# Patient Record
Sex: FEMALE | Race: WHITE | NOT HISPANIC OR LATINO
[De-identification: names, ages, dates, MRNs, and addresses within clinical notes are randomized per-mention and may not be internally consistent; named-entity substitution may affect disease eponyms.]

---

## 2019-10-03 ENCOUNTER — RECORDS - HEALTHEAST (OUTPATIENT)
Dept: ADMINISTRATIVE | Facility: OTHER | Age: 64
End: 2019-10-03

## 2019-10-03 ENCOUNTER — RECORDS - HEALTHEAST (OUTPATIENT)
Dept: LAB | Facility: CLINIC | Age: 64
End: 2019-10-03

## 2019-10-03 LAB
ALBUMIN SERPL-MCNC: 3.3 G/DL (ref 3.5–5)
ALP SERPL-CCNC: 101 U/L (ref 45–120)
ALT SERPL W P-5'-P-CCNC: 13 U/L (ref 0–45)
ANION GAP SERPL CALCULATED.3IONS-SCNC: 11 MMOL/L (ref 5–18)
AST SERPL W P-5'-P-CCNC: 17 U/L (ref 0–40)
BILIRUB SERPL-MCNC: 0.2 MG/DL (ref 0–1)
BUN SERPL-MCNC: 12 MG/DL (ref 8–22)
CALCIUM SERPL-MCNC: 8.9 MG/DL (ref 8.5–10.5)
CHLORIDE BLD-SCNC: 106 MMOL/L (ref 98–107)
CO2 SERPL-SCNC: 23 MMOL/L (ref 22–31)
CREAT SERPL-MCNC: 0.65 MG/DL (ref 0.6–1.1)
FOLATE SERPL-MCNC: 12.1 NG/ML
GFR SERPL CREATININE-BSD FRML MDRD: >60 ML/MIN/1.73M2
GLUCOSE BLD-MCNC: 116 MG/DL (ref 70–125)
POTASSIUM BLD-SCNC: 3.4 MMOL/L (ref 3.5–5)
PROT SERPL-MCNC: 6.9 G/DL (ref 6–8)
SODIUM SERPL-SCNC: 140 MMOL/L (ref 136–145)
TSH SERPL DL<=0.005 MIU/L-ACNC: 4.2 UIU/ML (ref 0.3–5)
VIT B12 SERPL-MCNC: 394 PG/ML (ref 213–816)

## 2019-10-04 LAB
25(OH)D3 SERPL-MCNC: 10.2 NG/ML (ref 30–80)
BACTERIA SPEC CULT: NO GROWTH

## 2019-10-11 ENCOUNTER — HOSPITAL ENCOUNTER (OUTPATIENT)
Dept: CT IMAGING | Facility: CLINIC | Age: 64
Discharge: HOME | End: 2019-10-11
Attending: PHYSICIAN ASSISTANT

## 2019-10-11 DIAGNOSIS — R05.9 COUGH: ICD-10-CM

## 2019-10-11 DIAGNOSIS — F17.200 SMOKER: ICD-10-CM

## 2019-10-11 DIAGNOSIS — R63.4 LOSS OF WEIGHT: ICD-10-CM

## 2019-10-14 ENCOUNTER — RECORDS - HEALTHEAST (OUTPATIENT)
Dept: ADMINISTRATIVE | Facility: OTHER | Age: 64
End: 2019-10-14

## 2019-10-15 ENCOUNTER — RECORDS - HEALTHEAST (OUTPATIENT)
Dept: ADMINISTRATIVE | Facility: OTHER | Age: 64
End: 2019-10-15

## 2019-10-15 ENCOUNTER — AMBULATORY - HEALTHEAST (OUTPATIENT)
Dept: PULMONOLOGY | Facility: OTHER | Age: 64
End: 2019-10-15

## 2019-10-15 ENCOUNTER — COMMUNICATION - HEALTHEAST (OUTPATIENT)
Dept: PULMONOLOGY | Facility: OTHER | Age: 64
End: 2019-10-15

## 2019-10-15 DIAGNOSIS — R93.89 ABNORMAL CXR: ICD-10-CM

## 2019-11-07 ENCOUNTER — AMBULATORY - HEALTHEAST (OUTPATIENT)
Dept: PULMONOLOGY | Facility: OTHER | Age: 64
End: 2019-11-07

## 2019-11-07 RX ORDER — LITHIUM CARBONATE 300 MG
300 TABLET ORAL
Status: SHIPPED | COMMUNITY
Start: 2019-10-22

## 2019-11-07 RX ORDER — CLONAZEPAM 0.5 MG/1
TABLET ORAL
Refills: 5 | Status: SHIPPED | COMMUNITY
Start: 2019-10-07

## 2019-11-13 ENCOUNTER — RECORDS - HEALTHEAST (OUTPATIENT)
Dept: PULMONOLOGY | Facility: OTHER | Age: 64
End: 2019-11-13

## 2019-11-13 ENCOUNTER — RECORDS - HEALTHEAST (OUTPATIENT)
Dept: ADMINISTRATIVE | Facility: OTHER | Age: 64
End: 2019-11-13

## 2019-11-13 ENCOUNTER — OFFICE VISIT - HEALTHEAST (OUTPATIENT)
Dept: PULMONOLOGY | Facility: OTHER | Age: 64
End: 2019-11-13

## 2019-11-13 DIAGNOSIS — K44.9 HIATAL HERNIA: ICD-10-CM

## 2019-11-13 DIAGNOSIS — R05.3 CHRONIC COUGH: ICD-10-CM

## 2019-11-13 DIAGNOSIS — R93.89 ABNORMAL FINDINGS ON DIAGNOSTIC IMAGING OF OTHER SPECIFIED BODY STRUCTURES: ICD-10-CM

## 2019-11-13 LAB — HGB BLD-MCNC: 11.7 G/DL

## 2019-11-13 ASSESSMENT — MIFFLIN-ST. JEOR: SCORE: 1058.23

## 2019-11-18 ENCOUNTER — RECORDS - HEALTHEAST (OUTPATIENT)
Dept: LAB | Facility: CLINIC | Age: 64
End: 2019-11-18

## 2019-11-19 LAB — 25(OH)D3 SERPL-MCNC: 34.5 NG/ML (ref 30–80)

## 2021-06-03 NOTE — PROGRESS NOTES
PULMONARY OUTPATIENT CONSULT NOTE    Assessment:   1. Chronic cough  Likely related to silent acid flux.   Patient denies acid reflux symptoms, recent CT scan showed a large hiatal hernia with intrathoracic stomach.   Patient denies history of asthma or outdoors allergies. No postnasal drip.   Ongoing tobacco use. Normal lung parenchyma in CT scan, PFTs showed normal spirometry, no bronchodilator response and mild reduction of diffusion capacity.   Recommend to avoid eating close to bedtime, elevated the head and start PPI daily if persistent cough. Quit tobacco use.    2. Large hiatal hernia with intrathoracic stomach  Elevate the head of the bed   Avoid eating close to bedtime  Start Omeprazole 20 mg daily   3. Tobacco user  Smoking cessation counseling.     Plan:   1. Chronic cough could be related to acid reflux. In view of significant hiatal hernia.   2. Elevate the head of the bed   3. Avoid eating close to bedtime  4. If cough comes back , start an antiacid Omeprazole 20 mg daily   5. Patient is scheduled to see GI and to have an upper endoscopy for further evaluation of hiatal hernia.   6. If persistent cough beside antiacid , will consider to start an inhale steroid.   7. Smoking cessation counseling  8. Follow up as needed    Thank you for this consultation.     Darrell Longoria  Pulmonary / Critical Care  11/13/2019    CC:   Chronic cough    HPI:       Criselda Herrmann is an 63 y.o. female who presents for evaluation of cough.  Patient has history of generalized anxiety disorder.   Patient was evaluated by PCP for non intentional weight loss, 40 to 50 lbs over the last three years. A chest / abdomen / pelvis CT scan was done on 10/11/2019, study was negative for malignancy, but showed a large hiatal hernia with intrathoracic stomach. Patient is scheduled to see GI for upper endoscopy.   Patient was referred to this clinic for evaluation of chronic cough.   Patient reports mild dry / productive  cough of white sputum. Day and night, sightly worse at night. Unchanged cough with weather changes.   Patient was treated with back to back antibiotics by PCP.   Cough has somewhat improved over the last two weeks.   Patient denies history of asthma or outdoors allergies.   No exertional dyspnea or wheezes. Denies postnasal drip or nasal congestion.   Denies chest pain, orthopnea or PND. Mild swelling of LEs.   Denies acid reflux symptoms.   Dinner time at 5 to 6 PM, bedtime at 9 PM, gets up between 5 to 6 AM. Reports snoring, no witness apneas. Somewhat refresh in AM. Feels depress, psy meds were adjusted by PCP a month ago.   Tobacco user 1/2 ppd for 25 years. Started to cut down in her smoking.   Denies family history of malignancy.     PMH  - Hiatal hernia  - Generalized anxiety disorder  - S/p cholecystectomy    Medications:     Prior to Admission medications    Medication Sig Start Date End Date Taking? Authorizing Provider   cholecalciferol, vitamin D3, 5,000 unit capsule Take 5,000 Units by mouth daily. 10/10/19   PROVIDER, HISTORICAL   clonazePAM (KLONOPIN) 0.5 MG tablet TK 1/2 T PO QID AND 1 T D PRN. 10/7/19   PROVIDER, HISTORICAL   FLUoxetine (PROZAC) 20 MG capsule Take 40 mg by mouth. 10/22/19   PROVIDER, HISTORICAL   lithium 300 mg tablet Take 300 mg by mouth. 10/22/19   PROVIDER, HISTORICAL     Social History     Socioeconomic History     Marital status: Single     Spouse name: Not on file     Number of children: Not on file     Years of education: Not on file     Highest education level: Not on file   Occupational History     Not on file   Social Needs     Financial resource strain: Not on file     Food insecurity:     Worry: Not on file     Inability: Not on file     Transportation needs:     Medical: Not on file     Non-medical: Not on file   Tobacco Use     Smoking status: Current Every Day Smoker     Smokeless tobacco: Never Used   Substance and Sexual Activity     Alcohol use: Not on file      "Drug use: Not on file     Sexual activity: Not on file   Lifestyle     Physical activity:     Days per week: Not on file     Minutes per session: Not on file     Stress: Not on file   Relationships     Social connections:     Talks on phone: Not on file     Gets together: Not on file     Attends Holiness service: Not on file     Active member of club or organization: Not on file     Attends meetings of clubs or organizations: Not on file     Relationship status: Not on file     Intimate partner violence:     Fear of current or ex partner: Not on file     Emotionally abused: Not on file     Physically abused: Not on file     Forced sexual activity: Not on file   Other Topics Concern     Not on file   Social History Narrative     Not on file     FAMILY Hx  - Father alive 81 years old  - Mother diet at 79 years old, unclear etiology    Review of Systems  A 12 point comprehensive review of systems was negative except as noted.        Objective:     Vitals:    11/13/19 1514   BP: 124/82   Pulse: 85   SpO2: 98%   Weight: 123 lb (55.8 kg)   Height: 5' 1.5\" (1.562 m)     Gen: awake, alert, no distress  HEENT: pink conjunctiva, moist mucosa, Mallampati II/IV  Neck: no thyromegaly, masses or JVD  Lungs: clear  CV: regular, no murmurs or gallops appreciated  Abdomen: soft, NT, BS wnl  Ext: no edema  Neuro: CN II-XII intact, non focal      Diagnostic tests:     PFTs 11/13/2019  FEV1/FVC is 78 and is normal.  FEV1 is 85% predicted and is normal.  FVC is 87% predicted and normal.  There was no improvement in spirometry after a single inhaled does of bronchodilator.  TLC is 101% predicted and is normal.  RV is 119% predicted and is normal.  DLCO is 73% predicted and is reduced when it is corrected for hemoglobin.  Flow volume loops are normal.    CT CHEST ABDOMEN PELVIS WO ORAL W IV CONTRAST DATE/TIME: 10/11/2019 11:46 AM   INDICATION: Abnormal weight loss.   COMPARISON: None.   FINDINGS: LUNGS AND PLEURA: Normal. " MEDIASTINUM/AXILLAE: There is a large hiatal hernia with an intrathoracic stomach. The stomach is incompletely organoaxially rotated, with the gastric antrum superior to the fundus. No lymphadenopathy. HEPATOBILIARY: Normal. PANCREAS: Normal. SPLEEN: Normal. KIDNEYS/BLADDER: Normal. ADRENAL GLANDS: Normal. BOWEL: Normal. LYMPH NODES: Normal. PELVIC ORGANS: Normal. MUSCULOSKELETAL: Normal. OTHER: None.   IMPRESSION:  1.  No evidence of malignancy in the chest, abdomen, or pelvis. 2.  Large hiatal hernia with intrathoracic stomach. No current obstruction.

## 2021-06-03 NOTE — PATIENT INSTRUCTIONS - HE
1. Chronic cough could be related to acid reflux. In view of significant hiatal hernia.   2. Elevate the head of the bed   3. Avoid eating close to bedtime  4. If cough comes back , start an antiacid Omeprazole 20 mg daily   5. Patient is scheduled to see GI and to have an upper endoscopy for further evaluation of hiatal hernia.   6. If persistent cough beside antiacid , will consider to start an inhale steroid.   7. Smoking cessation counseling  8. Follow up as needed

## 2021-06-04 VITALS
BODY MASS INDEX: 22.63 KG/M2 | HEIGHT: 62 IN | OXYGEN SATURATION: 98 % | DIASTOLIC BLOOD PRESSURE: 82 MMHG | HEART RATE: 85 BPM | WEIGHT: 123 LBS | SYSTOLIC BLOOD PRESSURE: 124 MMHG

## 2021-06-19 NOTE — LETTER
Letter by Darrell Kay MD at      Author: Darrell Kay MD Service: -- Author Type: --    Filed:  Encounter Date: 10/15/2019 Status: Signed         Criselda Herrmann  [address] [place] [address]    October 15, 2019    Dear Ms. Herrmann,    Welcome to [facility]! Your appointment information is below.   Please bring the following to your appointment:    Insurance Card, so we may scan it for our records    Drivers license or valid ID, so we may scan it for our records    Co-pay (as applicable per your insurance plan)    A current list of your medications including over the counter products such as vitamins and supplements    Your medical records including copies of X-Ray films if you are transferring your care from another clinic.  If you do not have your records, please fill out the release of information form and we will request those records.     Provider: Darrell Longoria MD  Appointment Date:  Wednesday, November 13, 2019  Arrival Time:   3:00 PFT,  4:00 dr iqbal.    Location: [address]        [facility], [id number]    **Please allow adequate time for your commute and parking. If you are more than 10 minutes late, you may be asked to reschedule.     If you need to cancel or reschedule your appointment, please notify us at least 24 hours prior to your appointment time so we are able to make this time available for another patient.    Thank you for choosing the [facility] for your health care needs. If you have any questions, please do not hesitate to contact us at any time at   [phone]. We look forward to caring for you.     Sincerely,     [facility] staff